# Patient Record
Sex: MALE | Race: BLACK OR AFRICAN AMERICAN | URBAN - METROPOLITAN AREA
[De-identification: names, ages, dates, MRNs, and addresses within clinical notes are randomized per-mention and may not be internally consistent; named-entity substitution may affect disease eponyms.]

---

## 2021-09-23 ENCOUNTER — HOSPITAL ENCOUNTER (OUTPATIENT)
Dept: PHYSICAL THERAPY | Age: 46
Discharge: HOME | End: 2021-09-23
Payer: MEDICAID

## 2021-09-23 PROCEDURE — 97161 PT EVAL LOW COMPLEX 20 MIN: CPT | Performed by: GENERAL ACUTE CARE HOSPITAL

## 2021-09-23 NOTE — PROGRESS NOTES
PT DAILY TREATMENT NOTE     Patient Name: Elvi Cisneros  Date:2021  : 1975  [x]  Patient  Verified  Payor: [address] / Plan: [facility] / Product Type: Managed Care Medicaid /    In time:1140  Out time:1230  Total Treatment Time (min): 50  Total Timed Codes (min): NC  1:1 Treatment Time (min): 50   Visit #: 1 of     Treatment Area: Difficulty waking [G47.8]  Left knee pain [M25.562]  Right knee pain [M25.561]    SUBJECTIVE  Pain Level (0-10 scale): 6/10  Any medication changes, allergies to medications, adverse drug reactions, diagnosis change, or new procedure performed?: [x] No    [] Yes (see summary sheet for update)  Subjective functional status/changes:   [] No changes reported   Pt is a 43 y/o male who presents with R knee pain and weakness of L knee with frequent buckling that began after a fall. Pt states that he fell backwards from the top of a staircase about 8 months ago after a misstep. Pt required assistance to return to standing secondary to weakness in L knee . States he had trouble with full weight bearing for several days due to buckling. Over the course of several months, pt notes onset of R knee pain that is progressively worsening, likely secondary to compensating for L knee weakness. Pt went to ER on 21 secondary to R knee pain and onset of limited ROM. ER performed xrays which was (+) for arthritis bilaterally. Pt was given pain medication, but no other treatment has been completed at this time. 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 Pt will benefit from skilled PT to address his current impairments and restore to PLOF and return to work.  Assessment as follows:      Pain (C) 6/10 (B) 6/10 (W) 10/10  Aggravating: prolonged walking, standing, stairs  Easing: nothing has significantly helped  Palpation: +ttp R patellar and quad tendon,  joint line Circumference: suprapatellar: R 57 cm, L 54.5 cm , Midpatellar R 52 cm, L 50.5 cm  Infrapatellar R 49cm,  L 48 cm  Walking tolerance: 15 minutes   Standing tolerance: 30 minutes     Knee AROM: R 0-105, L 0-120  Patellar mobility: hypomobility of R compared to L in all directions     Strength:   Hip flex 4+/5,  abd R 4/5, L 3/5 , Bridge 50%  Knee flex R 4+/5, L 4/5,  Ext R 5/5, L 3+/5  Ankle DF R 5/5, L 4+/5     Squat: R sided weight shift, able to stand up from a 20 height without UE support   Gait: antalgic , slowed lindsey  Stairs: Step to pattern up/down; ascending with R LE, descending with L LE   SLS: ~20 seconds bilaterally      Special tests: valgus/varus stress test (-), ant/posterior drawer (-) bilaterally     OBJECTIVE      NC min Therapeutic Exercise:  [] See flow sheet : NC per insurance restrictions   Rationale: increase ROM and increase strength to improve the patients ability to walk, squat, stairs           min Patient Education: [x] Review HEP    [] Progressed/Changed HEP based on:   Given initial HEP with printout and review of all exercises in clinic   Educated on use of ice for swelling management  Pt asking about procuring a knee brace; educated on pro/cons of bracing        Other Objective/Functional Measures:   Justification for Eval Code Complexity:  Patient History : no cormorbidities listed  Examination see exam   Clinical Presentation: evolving  Clinical Decision Making : FOTO : TBA /100    Pain Level (0-10 scale) post treatment: 6/10    ASSESSMENT/Changes in Function: See POC    Patient will continue to benefit from skilled PT services to modify and progress therapeutic interventions, address functional mobility deficits, address ROM deficits, address strength deficits, analyze and address soft tissue restrictions, analyze and cue movement patterns, analyze and modify body mechanics/ergonomics, assess and modify postural abnormalities, address imbalance/dizziness and instruct in home and community integration to attain remaining goals.      [x]  See Plan of Care  []  See progress note/recertification  []  See Discharge Summary         Progress towards goals / Updated goals:  See POC    PLAN  [x]  Upgrade activities as tolerated     [x]  Continue plan of care  []  Update interventions per flow sheet       []  Discharge due to:_  []  Other:_      Anusha Smith, SAMSON 9/23/2021  11:15 AM    Future Appointments   Date Time Provider Hugo Soler   9/23/2021 11:30 AM [address] 2 [facility]

## 2021-09-23 NOTE — PROGRESS NOTES
[address] [facility] AT 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 Comorbidities: None listed   Medications: Verified on Patient Summary List   The Plan of Care and following information is based on the information from the initial evaluation.   ========================================================================  Assessment / key information:  Pt is a 43 y/o male who presents with R knee pain and weakness of L knee with frequent buckling that began after a fall. Pt states that he fell backwards from the top of a staircase about 8 months ago after a misstep. Pt required assistance to return to standing secondary to weakness in L knee . States he had trouble with full weight bearing for several days due to buckling. Over the course of several months, pt notes onset of R knee pain that is progressively worsening, likely secondary to compensating for L knee weakness. Pt went to ER on 21 secondary to R knee pain and onset of limited ROM. ER performed xrays which was (+) for arthritis bilaterally. Pt was given pain medication, but no other treatment has been completed at this time.  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 Pt will benefit from skilled PT to address his current impairments and restore to PLOF and return to work.  Assessment as follows:     Pain (C) 6/10 (B) 6/10 (W) 10/10  Aggravating: prolonged walking, standing, stairs  Easing: nothing has significantly helped  Palpation: +ttp R patellar and quad tendon,  joint line   Circumference: suprapatellar: R 57 cm, L 54.5 cm , Midpatellar R 52 cm, L 50.5 cm  Infrapatellar R 49cm,  L 48 cm  Walking tolerance: 15 minutes   Standing tolerance: 30 minutes    Knee AROM: R 0-105, L 0-120  Patellar mobility: hypomobility of R compared to L in all directions    Strength:   Hip flex 4+/5,  abd R 4/5, L 3/5 , Bridge 50%  Knee flex R 4+/5, L 4/5,  Ext R 5/5, L 3+/5  Ankle DF R 5/5, L 4+/5    Squat: R sided weight shift, able to stand up from a 20 height without UE support   Gait: antalgic , slowed lindsey  Stairs: Step to pattern up/down; ascending with R LE, descending with L LE   SLS: ~20 seconds bilaterally     Special tests: valgus/varus stress test (-), ant/posterior drawer (-) bilaterally     ========================================================================  Eval Complexity: History: LOW Complexity : Zero comorbidities / personal factors that will impact the outcome / POCExam:HIGH Complexity : 4+ Standardized tests and measures addressing body structure, function, activity limitation and / or participation in recreation  Presentation: MEDIUM Complexity : Evolving with changing characteristics  Clinical Decision Making:MEDIUM Complexity : FOTO score of 26-74Overall Complexity:LOW   Problem List: pain affecting function, decrease ROM, decrease strength, edema affecting function, impaired gait/ balance, decrease ADL/ functional abilitiies, decrease activity tolerance, decrease flexibility/ joint mobility and decrease transfer abilities   Treatment Plan may include any combination of the following: Therapeutic exercise, Therapeutic activities, Neuromuscular re-education, Physical agent/modality, Gait/balance training, Manual therapy, Patient education, Self Care training, Functional mobility training, Home safety training and Stair training  Patient / Family readiness to learn indicated by: asking questions, trying to perform skills and interest  Persons(s) to be included in education: patient (P)  Barriers to Learning/Limitations: None  Measures taken:    Patient Goal (s): \"to walk normal again\"   Patient self reported health status: fair  Rehabilitation Potential: good   Short Term Goals: To be accomplished in  1  weeks:  1. Pt will be independent and compliant with HEP for carryover of strength and mobility gains  Status at last assessment: Issued at [place] [address] Term Goals: To be accomplished in  4-6  weeks:  1. Pt will score 10 points higher on FOTO to show significant improvement in functional mobility and QOL   Status at last assessment: TBA at first FU   2. Pt will demo L knee extension strength >4+/5 for improved stance phase stability and safety with gait  Status at last assessment :  Ext R 5/5, L 3+/5  3. Pt will demo hip abduction strength >4+/5 for improved gait mechanics and squat form   Status at last assessment  abd R 4/5, L 3/5   4. Pt will report >50 % improvement in overall pain and functional mobility for increased activity tolerance and return to work   Status at last assessment: unable to perform previous work duties   5.  Pt will demo ability to reciprocally navigate 1 flight of stairs with single railing for ease of home entry  Status at last assessment: step to pattern up/down with B rails   Frequency / Duration:   Patient to be seen  2  times per week for 8-12  treatments:     Patient / Caregiver education and instruction: activity modification and exercises  Therapist Signature: Queenie Zhou PT Date: 8/62/6951   Certification Period: na Time: 11:12 AM ========================================================================  I certify that the above Physical Therapy Services are being furnished while the patient is under my care. I agree with the treatment plan and certify that this therapy is necessary. Physician Signature:        Date:       Time: ___                                            Sammi Nolen,*. Please sign and return to [facility] or you may fax the signed copy to [phone]. Thank you.

## 2021-10-19 ENCOUNTER — APPOINTMENT (OUTPATIENT)
Dept: PHYSICAL THERAPY | Age: 46
End: 2021-10-19

## 2021-10-21 ENCOUNTER — APPOINTMENT (OUTPATIENT)
Dept: PHYSICAL THERAPY | Age: 46
End: 2021-10-21

## 2021-10-26 ENCOUNTER — APPOINTMENT (OUTPATIENT)
Dept: PHYSICAL THERAPY | Age: 46
End: 2021-10-26

## 2021-10-28 ENCOUNTER — APPOINTMENT (OUTPATIENT)
Dept: PHYSICAL THERAPY | Age: 46
End: 2021-10-28

## 2021-11-02 ENCOUNTER — APPOINTMENT (OUTPATIENT)
Dept: PHYSICAL THERAPY | Age: 46
End: 2021-11-02

## 2021-11-04 ENCOUNTER — APPOINTMENT (OUTPATIENT)
Dept: PHYSICAL THERAPY | Age: 46
End: 2021-11-04

## 2023-06-22 ENCOUNTER — HOSPITAL ENCOUNTER (OUTPATIENT)
Facility: HOSPITAL | Age: 48
Setting detail: RECURRING SERIES
Discharge: HOME | End: 2023-06-25
Payer: MEDICAID

## 2023-06-22 PROCEDURE — 97162 PT EVAL MOD COMPLEX 30 MIN: CPT

## 2023-06-30 ENCOUNTER — HOSPITAL ENCOUNTER (OUTPATIENT)
Facility: HOSPITAL | Age: 48
Setting detail: RECURRING SERIES
End: 2023-06-30
Payer: MEDICAID

## 2023-07-03 ENCOUNTER — HOSPITAL ENCOUNTER (OUTPATIENT)
Facility: HOSPITAL | Age: 48
Setting detail: RECURRING SERIES
Discharge: HOME | End: 2023-07-06
Payer: MEDICAID

## 2023-07-03 PROCEDURE — 97112 NEUROMUSCULAR REEDUCATION: CPT

## 2023-07-03 PROCEDURE — 97116 GAIT TRAINING THERAPY: CPT

## 2023-07-03 PROCEDURE — 97530 THERAPEUTIC ACTIVITIES: CPT

## 2023-07-03 PROCEDURE — 97110 THERAPEUTIC EXERCISES: CPT

## 2023-07-03 NOTE — PROGRESS NOTES
PHYSICAL / OCCUPATIONAL THERAPY - DAILY TREATMENT NOTE (updated )    Patient Name: Nasra Ortega    Date: 7/3/2023    : 1975  Insurance: Payor: [facility] MEDICAID / Plan: [place] [email or web address] 4.0 / Product Type: *No Product type* /      Patient  verified Yes     Visit #   Current / Total 2 10   Time   In / Out 11:10 11:54   Pain   In / Out 5 4   Subjective Functional Status/Changes: Pt moved up to 11am appt, but then arrived 10 minutes late. Advised that he would have shortened appt     \"It's always there. I'm used to the pain. I can normally walk and it starts achy pain in 12 minutes\"     TREATMENT AREA =  Pain in right knee [M25.561]    OBJECTIVE    Modalities Rationale:     decrease pain and increase tissue extensibility to improve patient's ability to progress to PLOF and address remaining functional goals. min [] Estim Unattended, type/location:                                      []  w/ice    []  w/heat    min [] Estim Attended, type/location:                                     []  w/US     []  w/ice    []  w/heat    []  TENS insruct      min []  Mechanical Traction: type/lbs                   []  pro   []  sup   []  int   []  cont    []  before manual    []  after manual    min []  Ultrasound, settings/location:      min []  Iontophoresis w/ dexamethasone, location:                                               []  take home patch       []  in clinic   PD  min  unbill []  Ice     []  Heat    location/position:     min []  Paraffin,  details:     min []  Vasopneumatic Device, press/temp:     min []  Angel Pouch / Steff Gobble:     If using vaso (only need to measure limb vaso being performed on)      pre-treatment girth :       post-treatment girth :       measured at (landmark location) :      min []  Other:    Skin assessment post-treatment (if applicable):    []  intact    []  redness- no adverse reaction                 []redness - adverse reaction:         Therapeutic

## 2023-07-06 ENCOUNTER — HOSPITAL ENCOUNTER (OUTPATIENT)
Facility: HOSPITAL | Age: 48
Setting detail: RECURRING SERIES
Discharge: HOME | End: 2023-07-09
Payer: MEDICAID

## 2023-07-06 PROCEDURE — 97112 NEUROMUSCULAR REEDUCATION: CPT | Performed by: PHYSICAL THERAPIST

## 2023-07-06 PROCEDURE — 97530 THERAPEUTIC ACTIVITIES: CPT | Performed by: PHYSICAL THERAPIST

## 2023-07-06 PROCEDURE — 97116 GAIT TRAINING THERAPY: CPT | Performed by: PHYSICAL THERAPIST

## 2023-07-06 PROCEDURE — 97110 THERAPEUTIC EXERCISES: CPT | Performed by: PHYSICAL THERAPIST

## 2023-07-13 ENCOUNTER — APPOINTMENT (OUTPATIENT)
Facility: HOSPITAL | Age: 48
End: 2023-07-13
Payer: MEDICAID

## 2023-07-17 ENCOUNTER — APPOINTMENT (OUTPATIENT)
Facility: HOSPITAL | Age: 48
End: 2023-07-17
Payer: MEDICAID

## 2023-07-20 ENCOUNTER — APPOINTMENT (OUTPATIENT)
Facility: HOSPITAL | Age: 48
End: 2023-07-20
Payer: MEDICAID

## 2023-07-24 ENCOUNTER — APPOINTMENT (OUTPATIENT)
Facility: HOSPITAL | Age: 48
End: 2023-07-24
Payer: MEDICAID

## 2023-07-27 ENCOUNTER — APPOINTMENT (OUTPATIENT)
Facility: HOSPITAL | Age: 48
End: 2023-07-27
Payer: MEDICAID